# Patient Record
Sex: MALE | Race: WHITE
[De-identification: names, ages, dates, MRNs, and addresses within clinical notes are randomized per-mention and may not be internally consistent; named-entity substitution may affect disease eponyms.]

---

## 2020-11-09 ENCOUNTER — HOSPITAL ENCOUNTER (EMERGENCY)
Dept: HOSPITAL 57 - BURERS | Age: 25
End: 2020-11-09
Payer: SELF-PAY

## 2020-11-09 ENCOUNTER — HOSPITAL ENCOUNTER (EMERGENCY)
Dept: HOSPITAL 92 - ERS | Age: 25
LOS: 1 days | Discharge: HOME | End: 2020-11-10
Payer: SELF-PAY

## 2020-11-09 DIAGNOSIS — R10.11: ICD-10-CM

## 2020-11-09 DIAGNOSIS — F17.210: ICD-10-CM

## 2020-11-09 DIAGNOSIS — R10.11: Primary | ICD-10-CM

## 2020-11-09 DIAGNOSIS — R10.13: Primary | ICD-10-CM

## 2020-11-09 LAB
ALBUMIN SERPL BCG-MCNC: 4.2 G/DL (ref 3.5–5)
ALP SERPL-CCNC: 86 U/L (ref 40–110)
ALT SERPL W P-5'-P-CCNC: 21 U/L (ref 8–55)
ANION GAP SERPL CALC-SCNC: 15 MMOL/L (ref 10–20)
AST SERPL-CCNC: 21 U/L (ref 5–34)
BASOPHILS # BLD AUTO: 0.1 THOU/UL (ref 0–0.2)
BASOPHILS NFR BLD AUTO: 1 % (ref 0–1)
BILIRUB SERPL-MCNC: 0.4 MG/DL (ref 0.2–1.2)
BUN SERPL-MCNC: 8 MG/DL (ref 8.9–20.6)
CALCIUM SERPL-MCNC: 8.9 MG/DL (ref 7.8–10.44)
CHLORIDE SERPL-SCNC: 103 MMOL/L (ref 98–107)
CO2 SERPL-SCNC: 25 MMOL/L (ref 22–29)
CREAT CL PREDICTED SERPL C-G-VRATE: 0 ML/MIN (ref 70–130)
EOSINOPHIL # BLD AUTO: 2.2 THOU/UL (ref 0–0.7)
EOSINOPHIL NFR BLD AUTO: 17.5 % (ref 0–10)
GLOBULIN SER CALC-MCNC: 2.4 G/DL (ref 2.4–3.5)
GLUCOSE SERPL-MCNC: 80 MG/DL (ref 70–105)
HGB BLD-MCNC: 16.6 G/DL (ref 14–18)
LIPASE SERPL-CCNC: 7 U/L (ref 8–78)
LYMPHOCYTES # BLD AUTO: 4 THOU/UL (ref 1.2–3.4)
LYMPHOCYTES NFR BLD AUTO: 31.8 % (ref 21–51)
MCH RBC QN AUTO: 30.7 PG (ref 27–31)
MCV RBC AUTO: 92.9 FL (ref 78–98)
MONOCYTES # BLD AUTO: 0.8 THOU/UL (ref 0.11–0.59)
MONOCYTES NFR BLD AUTO: 6.7 % (ref 0–10)
NEUTROPHILS # BLD AUTO: 5.4 THOU/UL (ref 1.4–6.5)
NEUTROPHILS NFR BLD AUTO: 43.1 % (ref 42–75)
PLATELET # BLD AUTO: 290 THOU/UL (ref 130–400)
POTASSIUM SERPL-SCNC: 4.1 MMOL/L (ref 3.5–5.1)
RBC # BLD AUTO: 5.4 MILL/UL (ref 4.7–6.1)
SODIUM SERPL-SCNC: 139 MMOL/L (ref 136–145)
WBC # BLD AUTO: 12.6 THOU/UL (ref 4.8–10.8)

## 2020-11-09 PROCEDURE — 99284 EMERGENCY DEPT VISIT MOD MDM: CPT

## 2020-11-09 PROCEDURE — 76705 ECHO EXAM OF ABDOMEN: CPT

## 2020-11-09 PROCEDURE — 81003 URINALYSIS AUTO W/O SCOPE: CPT

## 2020-11-09 PROCEDURE — 80053 COMPREHEN METABOLIC PANEL: CPT

## 2020-11-09 PROCEDURE — 85025 COMPLETE CBC W/AUTO DIFF WBC: CPT

## 2020-11-09 PROCEDURE — 83690 ASSAY OF LIPASE: CPT

## 2020-11-09 PROCEDURE — 96372 THER/PROPH/DIAG INJ SC/IM: CPT

## 2020-11-09 NOTE — ULT
Sonogram right upper quadrant



HISTORY: Right upper quadrant pain.



FINDINGS: The gallbladder has normal appearance without stone evident. Common duct is 0.5 cm. Liver i
s diffusely echogenic without focal mass



No free fluid.



  



IMPRESSION :

No evidence of gallstones or biliary obstruction.



Hepato-steatosis.



Reported By: DANA Torres 

Electronically Signed:  11/9/2020 11:27 PM

## 2020-11-10 LAB — SP GR UR STRIP: 1.02 (ref 1–1.04)
